# Patient Record
Sex: MALE | Race: WHITE | NOT HISPANIC OR LATINO | URBAN - METROPOLITAN AREA
[De-identification: names, ages, dates, MRNs, and addresses within clinical notes are randomized per-mention and may not be internally consistent; named-entity substitution may affect disease eponyms.]

---

## 2021-02-20 ENCOUNTER — EMERGENCY (EMERGENCY)
Facility: HOSPITAL | Age: 41
LOS: 1 days | Discharge: HOME | End: 2021-02-20
Attending: EMERGENCY MEDICINE | Admitting: EMERGENCY MEDICINE
Payer: COMMERCIAL

## 2021-02-20 VITALS
TEMPERATURE: 99 F | RESPIRATION RATE: 18 BRPM | HEART RATE: 73 BPM | WEIGHT: 149.91 LBS | HEIGHT: 72 IN | DIASTOLIC BLOOD PRESSURE: 65 MMHG | OXYGEN SATURATION: 98 % | SYSTOLIC BLOOD PRESSURE: 106 MMHG
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
## 2021-02-20 DIAGNOSIS — S52.502A UNSPECIFIED FRACTURE OF THE LOWER END OF LEFT RADIUS, INITIAL ENCOUNTER FOR CLOSED FRACTURE: ICD-10-CM

## 2021-02-20 DIAGNOSIS — M25.532 PAIN IN LEFT WRIST: ICD-10-CM

## 2021-02-20 PROCEDURE — 99284 EMERGENCY DEPT VISIT MOD MDM: CPT

## 2021-02-20 PROCEDURE — 73110 X-RAY EXAM OF WRIST: CPT

## 2021-02-20 PROCEDURE — 73110 X-RAY EXAM OF WRIST: CPT | Mod: 26,LT

## 2021-02-20 PROCEDURE — 73080 X-RAY EXAM OF ELBOW: CPT

## 2021-02-20 PROCEDURE — 99285 EMERGENCY DEPT VISIT HI MDM: CPT | Mod: 25

## 2021-02-20 PROCEDURE — 25605 CLTX DST RDL FX/EPHYS SEP W/: CPT | Mod: LT

## 2021-02-20 PROCEDURE — 73080 X-RAY EXAM OF ELBOW: CPT | Mod: 26,LT

## 2021-02-20 RX ORDER — OXYCODONE AND ACETAMINOPHEN 5; 325 MG/1; MG/1
1 TABLET ORAL
Qty: 16 | Refills: 0
Start: 2021-02-20 | End: 2021-02-23

## 2021-02-20 NOTE — ED PROVIDER NOTE - CARE PROVIDER_API CALL
Edmund Mccormack (MD)  [facility]  Orthopedics  [address], [address] [place] [address]  Phone: [phone]  Fax: [phone]  Follow Up Time:

## 2021-02-20 NOTE — ED PROVIDER NOTE - PATIENT PORTAL LINK FT
You can access the FollowMyHealth Patient Portal offered by [facility] by registering at the following website: [email or web address] By joining [facility]’s FollowMyHealth portal, you will also be able to view your health information using other applications (apps) compatible with our system.

## 2021-02-20 NOTE — ED ADULT NURSE NOTE - NSIMPLEMENTINTERV_GEN_ALL_ED
Implemented All Fall Risk Interventions:  [place] to call system. Call bell, personal items and telephone within reach. Instruct patient to call for assistance. Room bathroom lighting operational. Non-slip footwear when patient is off stretcher. Physically safe environment: no spills, clutter or unnecessary equipment. Stretcher in lowest position, wheels locked, appropriate side rails in place. Provide visual cue, wrist band, yellow gown, etc. Monitor gait and stability. Monitor for mental status changes and reorient to person, place, and time. Review medications for side effects contributing to fall risk. Reinforce activity limits and safety measures with patient and family.

## 2021-02-20 NOTE — ED PROVIDER NOTE - MUSCULOSKELETAL, MLM
Dinner fork deformity to L wrist, tender at distal radius and ulna, motor functions x3 intact, sensations intact, radial pulses intact. No elbow or shoulder tenderness, no C/T/L spine tenderness.

## 2021-02-20 NOTE — ED PROVIDER NOTE - PROGRESS NOTE DETAILS
reduced by ortho, neurovasc intact, f/u w Dr. Mccormack    emergent return instructions were discussed with patient

## 2021-02-20 NOTE — ED PROVIDER NOTE - CLINICAL SUMMARY MEDICAL DECISION MAKING FREE TEXT BOX
39 y/o M with L wrist injury, obvious fracture. Will XR and consult ortho, no other apparent injuries.

## 2021-02-20 NOTE — CONSULT NOTE ADULT - SUBJECTIVE AND OBJECTIVE BOX
Orthopaedic Surgery Consult Note    For Surgeon:    HPI:  40yMale  Patient is a 40y old  Male who presents with a chief complaint of   HPI:      Allergies    No Known Allergies    Intolerances      PAST MEDICAL & SURGICAL HISTORY:  No pertinent past medical history    No significant past surgical history      MEDICATIONS  (STANDING):    MEDICATIONS  (PRN):      Vital Signs Last 24 Hrs  T(C): 37.1 (20 Feb 2021 15:55), Max: 37.1 (20 Feb 2021 15:55)  T(F): 98.7 (20 Feb 2021 15:55), Max: 98.7 (20 Feb 2021 15:55)  HR: 73 (20 Feb 2021 15:55) (73 - 73)  BP: 106/65 (20 Feb 2021 15:55) (106/65 - 106/65)  BP(mean): --  RR: 18 (20 Feb 2021 15:55) (18 - 18)  SpO2: 98% (20 Feb 2021 15:55) (98% - 98%)    Physical Exam:              Imaging:     A/P: 40yMale    -Discussed with         Orthopaedic Surgery Consult Note    For Surgeon: [place]     HPI:  40M RHD s/p FOOSH with L DRF, dorsal angulation, intraarticular. Associated swelling and deformity. Denies numbness or tingling. No other injuries.       Allergies    No Known Allergies    Intolerances      PAST MEDICAL & SURGICAL HISTORY:  No pertinent past medical history    No significant past surgical history      MEDICATIONS  (STANDING):    MEDICATIONS  (PRN):      Vital Signs Last 24 Hrs  T(C): 37.1 (20 Feb 2021 15:55), Max: 37.1 (20 Feb 2021 15:55)  T(F): 98.7 (20 Feb 2021 15:55), Max: 98.7 (20 Feb 2021 15:55)  HR: 73 (20 Feb 2021 15:55) (73 - 73)  BP: 106/65 (20 Feb 2021 15:55) (106/65 - 106/65)  BP(mean): --  RR: 18 (20 Feb 2021 15:55) (18 - 18)  SpO2: 98% (20 Feb 2021 15:55) (98% - 98%)    Physical Exam:  Gen: Awake and alert, NAD  L wrist swelling and deformity  No open wounds  Sensation intact distally   Axilary/AIN/PIN/U grossly preserved   + rad pulse  Fingers WWP      Imaging:   L distal radius fracture, intraarticular with dorsal angulation     A/P: 40M s/p FOOSH with L DRF    - Reduced using hematoma block   - Splint and sling  - NWB LUE  - Pain control   - Splint care  - Elevate  - Outpt follow up with Dr. Mccormack for surgery this week    -Discussed with Dr. Mccormack

## 2021-02-20 NOTE — ED ADULT NURSE NOTE - NS_SISCREENINGSR_GEN_ALL_ED
As per daughter, Patient has been eating less, weak.  Likes soup and chocolate Ensure. Has been losing weight and states maybe 130 pounds and actual weight not in chart.  Patient states "I am very nauseous.  Just leave me alone". Negative

## 2021-02-20 NOTE — ED PROVIDER NOTE - OBJECTIVE STATEMENT
39 y/o M with trip and fall on ice, fell on outstretched L wrist, sustained obvious fracture of L wrist with deformity, associated swelling, pain with attempted movement. No numbness/tingling/weakness or other injuries. Brought to the ED by EMS, received morphine IM en route with pain improvement.

## 2021-02-20 NOTE — ED ADULT TRIAGE NOTE - OTHER COMPLAINTS
Patient reports left wrist pain after slip and fall on ice. Denies head injury. Per EMS, patient received Toradol 30mg, Morphine 5mg and Zofran 8mg prior to arrival.

## 2021-02-22 ENCOUNTER — APPOINTMENT (OUTPATIENT)
Dept: ORTHOPEDIC SURGERY | Facility: CLINIC | Age: 41
End: 2021-02-22

## 2021-02-22 PROBLEM — Z78.9 OTHER SPECIFIED HEALTH STATUS: Chronic | Status: ACTIVE | Noted: 2021-02-20
